# Patient Record
Sex: FEMALE | Race: WHITE | Employment: OTHER | URBAN - METROPOLITAN AREA
[De-identification: names, ages, dates, MRNs, and addresses within clinical notes are randomized per-mention and may not be internally consistent; named-entity substitution may affect disease eponyms.]

---

## 2024-02-05 ENCOUNTER — HOSPITAL ENCOUNTER (EMERGENCY)
Age: 34
Discharge: HOME | End: 2024-02-05
Attending: EMERGENCY MEDICINE
Payer: MEDICAID

## 2024-02-05 VITALS
SYSTOLIC BLOOD PRESSURE: 125 MMHG | OXYGEN SATURATION: 100 % | DIASTOLIC BLOOD PRESSURE: 90 MMHG | HEART RATE: 82 BPM | HEIGHT: 67 IN | WEIGHT: 196 LBS | TEMPERATURE: 98.2 F | RESPIRATION RATE: 17 BRPM | BODY MASS INDEX: 30.76 KG/M2

## 2024-02-05 DIAGNOSIS — S39.012A BACK STRAIN, INITIAL ENCOUNTER: Primary | ICD-10-CM

## 2024-02-05 PROCEDURE — 99283 EMERGENCY DEPT VISIT LOW MDM: CPT

## 2024-02-05 RX ORDER — IBUPROFEN 800 MG/1
800 TABLET ORAL EVERY 8 HOURS PRN
Qty: 21 TABLET | Refills: 0 | Status: SHIPPED | OUTPATIENT
Start: 2024-02-05 | End: 2024-02-05

## 2024-02-05 RX ORDER — METAXALONE 800 MG/1
400-800 TABLET ORAL 3 TIMES DAILY
Qty: 20 TABLET | Refills: 0 | Status: SHIPPED | OUTPATIENT
Start: 2024-02-05

## 2024-02-05 RX ORDER — IBUPROFEN 800 MG/1
800 TABLET ORAL EVERY 8 HOURS PRN
Qty: 21 TABLET | Refills: 0 | Status: SHIPPED | OUTPATIENT
Start: 2024-02-05

## 2024-02-05 RX ORDER — METAXALONE 800 MG/1
400-800 TABLET ORAL 3 TIMES DAILY
Qty: 20 TABLET | Refills: 0 | Status: SHIPPED | OUTPATIENT
Start: 2024-02-05 | End: 2024-02-05

## 2024-02-05 RX ORDER — TRAMADOL HYDROCHLORIDE 50 MG/1
50-100 TABLET ORAL EVERY 8 HOURS PRN
Qty: 12 TABLET | Refills: 0 | Status: SHIPPED | OUTPATIENT
Start: 2024-02-05 | End: 2024-02-05

## 2024-02-05 RX ORDER — TRAMADOL HYDROCHLORIDE 50 MG/1
50-100 TABLET ORAL EVERY 8 HOURS PRN
Qty: 12 TABLET | Refills: 0 | Status: SHIPPED | OUTPATIENT
Start: 2024-02-05 | End: 2024-02-08

## 2024-02-05 ASSESSMENT — PAIN - FUNCTIONAL ASSESSMENT: PAIN_FUNCTIONAL_ASSESSMENT: 0-10

## 2024-02-05 ASSESSMENT — PAIN DESCRIPTION - LOCATION: LOCATION: BACK;NECK

## 2024-02-05 ASSESSMENT — PAIN SCALES - GENERAL: PAINLEVEL_OUTOF10: 6

## 2024-02-05 NOTE — ED NOTES
I have reviewed discharge instructions with the patient.  The patient verbalized understanding.    Patient left ED via Discharge Method: ambulatory to Home with self    Opportunity for questions and clarification provided.       Patient given 3 scripts.         To continue your aftercare when you leave the hospital, you may receive an automated call from our care team to check in on how you are doing.  This is a free service and part of our promise to provide the best care and service to meet your aftercare needs.” If you have questions, or wish to unsubscribe from this service please call [phone].  Thank you for Choosing our [facility] Emergency Department.

## 2024-02-05 NOTE — DISCHARGE INSTRUCTIONS
Use cold packs 5 to 10 minutes, every hour to every-other-hour, for first 48 hours,  Then switch to a heating pad, 5 to 10 minutes, every hour to every-other-hour, for a few days,  Do not go to heat, right away

## 2024-02-05 NOTE — ED TRIAGE NOTES
Pt ambulatory to triage. 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 Pt denies taking anything for pain today, pt states she has been using lidocaine patches.

## 2024-02-14 NOTE — ED PROVIDER NOTES
Emergency Department Provider Note       PCP: No primary care provider on file.   Age: 33 y.o.   Sex: female     DISPOSITION Decision To Discharge 02/05/2024 03:53:05 PM       ICD-10-CM    1. Back strain, initial encounter  S39.012A traMADol (ULTRAM) 50 MG tablet     DISCONTINUED: traMADol (ULTRAM) 50 MG tablet          Medical Decision Making     Complexity of Problems Addressed:  1 minor injury    Data Reviewed and Analyzed:    X-rays not indicated    Discussion of management or test interpretation.  Atraumatic neck pain without suggestion of cauda equina syndrome.  Will prescribe NSAIDs, muscle relaxers, mild analgesics.  Recommend ice before heat.    Risk of Complications and/or Morbidity of Patient Management:  Prescription drug management performed.  Patient was discharged risks and benefits of hospitalization were considered.  Shared medical decision making was utilized in creating the patients health plan today.    History       33-year-old female presents to the ER with 1 week history of back pain.  Predominantly left-sided.  This started in the thoracic to upper lumbar region and as it has progressed , it is now going up to the neck and down the leg.  No history of back pain or sciatica.  Pain worsens with movement, she notices some spasms in her back.  Patient denies chest pain, dyspnea, abdominal pain.  There are no UTI symptoms.  Little relief with lidocaine patches         Review of Systems   Constitutional:  Negative for chills and fever.   Respiratory:  Negative for shortness of breath.    Cardiovascular:  Negative for chest pain.   Gastrointestinal: Negative.    Genitourinary: Negative.    Musculoskeletal:  Positive for back pain.   All other systems reviewed and are negative.      Physical Exam     Vitals signs and nursing note reviewed:  Vitals:    02/05/24 1442 02/05/24 1626   BP: (!) 152/84 (!) 125/90   Pulse: 81 82   Resp: 15 17   Temp: 98.2 °F (36.8 °C)    TempSrc: Oral    SpO2: 98% 100%

## 2024-03-13 ENCOUNTER — APPOINTMENT (OUTPATIENT)
Dept: ULTRASOUND IMAGING | Age: 34
End: 2024-03-13
Payer: MEDICAID

## 2024-03-13 ENCOUNTER — HOSPITAL ENCOUNTER (EMERGENCY)
Age: 34
Discharge: HOME | End: 2024-03-13
Payer: MEDICAID

## 2024-03-13 VITALS
OXYGEN SATURATION: 99 % | HEIGHT: 66 IN | SYSTOLIC BLOOD PRESSURE: 110 MMHG | HEART RATE: 70 BPM | TEMPERATURE: 98.9 F | DIASTOLIC BLOOD PRESSURE: 60 MMHG | RESPIRATION RATE: 20 BRPM | BODY MASS INDEX: 32.14 KG/M2 | WEIGHT: 200 LBS

## 2024-03-13 DIAGNOSIS — Z3A.01 LESS THAN 8 WEEKS GESTATION OF PREGNANCY: Primary | ICD-10-CM

## 2024-03-13 LAB
ABO + RH BLD: NORMAL
APPEARANCE UR: ABNORMAL
BACTERIA URNS QL MICRO: NORMAL /HPF
BILIRUB UR QL: NEGATIVE
CASTS URNS QL MICRO: 0 /LPF
COLOR UR: YELLOW
CRYSTALS URNS QL MICRO: 0 /LPF
EPI CELLS #/AREA URNS HPF: NORMAL /HPF
GLUCOSE UR STRIP.AUTO-MCNC: NEGATIVE MG/DL
HCG SERPL-ACNC: 5687 MIU/ML (ref 0–6)
HGB UR QL STRIP: ABNORMAL
KETONES UR QL STRIP.AUTO: NEGATIVE MG/DL
LEUKOCYTE ESTERASE UR QL STRIP.AUTO: NEGATIVE
MUCOUS THREADS URNS QL MICRO: 0 /LPF
NITRITE UR QL STRIP.AUTO: NEGATIVE
OTHER OBSERVATIONS: NORMAL
PH UR STRIP: 6.5 (ref 5–9)
PROT UR STRIP-MCNC: NEGATIVE MG/DL
RBC #/AREA URNS HPF: NORMAL /HPF
SP GR UR REFRACTOMETRY: >=1.03 (ref 1–1.02)
UROBILINOGEN UR QL STRIP.AUTO: 0.2 EU/DL (ref 0.2–1)
WBC URNS QL MICRO: NORMAL /HPF

## 2024-03-13 PROCEDURE — 76801 OB US < 14 WKS SINGLE FETUS: CPT

## 2024-03-13 PROCEDURE — 86900 BLOOD TYPING SEROLOGIC ABO: CPT

## 2024-03-13 PROCEDURE — 86901 BLOOD TYPING SEROLOGIC RH(D): CPT

## 2024-03-13 PROCEDURE — 99284 EMERGENCY DEPT VISIT MOD MDM: CPT

## 2024-03-13 PROCEDURE — 84702 CHORIONIC GONADOTROPIN TEST: CPT

## 2024-03-13 PROCEDURE — 81001 URINALYSIS AUTO W/SCOPE: CPT

## 2024-03-13 ASSESSMENT — PAIN - FUNCTIONAL ASSESSMENT: PAIN_FUNCTIONAL_ASSESSMENT: NONE - DENIES PAIN

## 2024-03-13 NOTE — ED TRIAGE NOTES
Per patient 5 positive pregnancy test at home. Denies any s/sx. States last menstrual cycle was the beginning of February.

## 2024-03-13 NOTE — ED PROVIDER NOTES
and  ultrasound.    A 1.8 x 1.5 x 1.9 cm corpus luteum cyst in the right ovary.    No free pelvic fluid or adnexal mass.    No sonographic evidence of ovarian torsion.   Urinalysis w rflx microscopic   Result Value Ref Range    Color, UA YELLOW      Appearance SLIGHTLY CLOUDY      Specific Gravity, UA >=1.030 1.001 - 1.023    pH, Urine 6.5 5.0 - 9.0      Protein, UA Negative NEG mg/dL    Glucose, UA Negative mg/dL    Ketones, Urine Negative NEG mg/dL    Bilirubin Urine Negative NEG      Blood, Urine Trace Intact (A) NEG      Urobilinogen, Urine 0.2 0.2 - 1.0 EU/dL    Nitrite, Urine Negative NEG      Leukocyte Esterase, Urine Negative NEG     HCG, Quantitative, Pregnancy   Result Value Ref Range    hCG Quant 5,687 (H) 0.0 - 6.0 MIU/ML   Urinalysis, Micro   Result Value Ref Range    WBC, UA 0-3 0 /hpf    RBC, UA 0-3 0 /hpf    Epithelial Cells UA 5-10 0 /hpf    BACTERIA, URINE TRACE 0 /hpf    Casts 0 0 /lpf    Crystals 0 0 /LPF    Mucus, UA 0 0 /lpf    Other observations RESULTS VERIFIED MANUALLY           US TRANSABDOMINAL TRANSVAGINAL LESS THAN 14 WEEKS   Final Result   Intrauterine pregnancy with a gestational sac and yolk sac in the endometrial   cavity. The mean sac diameter is 7.8 mm. Recommend follow-up with beta-hCGs and   ultrasound.      A 1.8 x 1.5 x 1.9 cm corpus luteum cyst in the right ovary.      No free pelvic fluid or adnexal mass.      No sonographic evidence of ovarian torsion.                   No results for input(s): \"COVID19\" in the last 72 hours.    Voice dictation software was used during the making of this note.  This software is not perfect and grammatical and other typographical errors may be present.  This note has not been completely proofread for errors.     Annamaria Mendez PA-C  03/13/24 [id number]

## 2024-03-13 NOTE — DISCHARGE INSTRUCTIONS
Your pregnancy test was positive.  Ultrasound did reveal an intrauterine pregnancy.  Please follow-up with OB, their numbers listed above.     Please return to the ER if you get experiencing cramping, pain, bleeding, excetra.

## 2024-03-27 ENCOUNTER — TELEPHONE (OUTPATIENT)
Dept: OBGYN CLINIC | Age: 34
End: 2024-03-27

## 2024-03-27 NOTE — TELEPHONE ENCOUNTER
Called patient to set up appointment.  Patient states she will need to call back after she gets her schedule form work.

## 2024-09-13 NOTE — TELEPHONE ENCOUNTER
----- Message from JULIO C Lombardo CNP sent at 3/27/2024  3:29 PM EDT -----  Regarding: RE: Referral Appointment  Preg confirmation US and talk - IUP seen on US  ----- Message -----  From: Janet Clarke  Sent: 3/27/2024  11:34 AM EDT  To: JULIO C Lombardo CNP  Subject: FW: Referral Appointment                         Please review ED records and provide f/u recommendations.  ----- Message -----  From: Armida Fournier  Sent: 3/27/2024  10:43 AM EDT  To: Janet Clarke  Subject: Referral Appointment                             Pt referral from ER less than 8 weeks pregnant         (1) Female

## 2024-12-02 ENCOUNTER — TELEPHONE (OUTPATIENT)
Dept: ORTHOPEDIC SURGERY | Age: 34
End: 2024-12-02

## 2025-01-23 ENCOUNTER — TELEPHONE (OUTPATIENT)
Dept: ORTHOPEDIC SURGERY | Age: 35
End: 2025-01-23

## 2025-01-23 ENCOUNTER — OFFICE VISIT (OUTPATIENT)
Age: 35
End: 2025-01-23

## 2025-01-23 VITALS — WEIGHT: 201.8 LBS | HEIGHT: 67 IN | BODY MASS INDEX: 31.67 KG/M2

## 2025-01-23 DIAGNOSIS — M54.50 LOW BACK PAIN, UNSPECIFIED BACK PAIN LATERALITY, UNSPECIFIED CHRONICITY, UNSPECIFIED WHETHER SCIATICA PRESENT: Primary | ICD-10-CM

## 2025-01-23 DIAGNOSIS — M47.816 LUMBAR FACET ARTHROPATHY: ICD-10-CM

## 2025-01-23 DIAGNOSIS — M51.360 DEGENERATION OF INTERVERTEBRAL DISC OF LUMBAR REGION WITH DISCOGENIC BACK PAIN: ICD-10-CM

## 2025-01-23 DIAGNOSIS — M54.16 LUMBAR RADICULOPATHY: ICD-10-CM

## 2025-01-30 NOTE — PROGRESS NOTES
Name: Gisela Stewart  YOB: 1990  Gender: female  MRN: [id number]    CC: Acute low back and right leg pain    HPI: This is a 34 y.o. year old female who presents after an injury at work in December 2024.  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  Pain is across the lower back into the right buttock down the posterior leg to her mid thigh.  It is constant discomfort but worse with position change in activity.  She was given ibuprofen and Skelaxin by the Worker's Comp. provider.  She was also placed on restrictions of 10 pounds limit of lifting no pushing or pulling or bending.  She has had no physical therapy.  She has had an MRI that revealed a disc bulge with small central protrusion and mild facet arthropathy at L5-S1.      The patient denies any change in bowel or bladder function since the onset of the symptoms. she  has not had lumbar surgery in the past.      Thus far, the patient has tried NSAIDS and muscle relaxers    Current pain level: 7-8/10  Activities limited by pain: Constant however increased activity or position change makes her pain worse           1/23/2025     2:26 PM   AMB PAIN ASSESSMENT   Location of Pain Back   Location Modifiers Right;Left   Severity of Pain 7   Frequency of Pain Constant   Limiting Behavior Yes   Result of Injury No   Work-Related Injury No   Are there other pain locations you wish to document? No            ROS/Meds/PSH/PMH/FH/SH: I personally reviewed the patient's collected intake data.  Below are the pertinents:    No Known Allergies      Current Outpatient Medications:     ibuprofen (IBU) 800 MG tablet, Take 1 tablet by mouth every 8 hours as needed for Pain (Patient not taking: Reported on 1/23/2025), Disp: 21 tablet, Rfl: 0    metaxalone (SKELAXIN) 800 MG tablet, Take 0.5-1 tablets by mouth 3 times daily (Patient not taking: Reported on 1/23/2025), Disp: 20 tablet, Rfl: 0    No past surgical history on file.    There is no problem list on file for

## 2025-02-04 ENCOUNTER — OFFICE VISIT (OUTPATIENT)
Dept: ORTHOPEDIC SURGERY | Age: 35
End: 2025-02-04
Payer: COMMERCIAL

## 2025-02-04 DIAGNOSIS — M54.6 MIDLINE THORACIC BACK PAIN, UNSPECIFIED CHRONICITY: ICD-10-CM

## 2025-02-04 DIAGNOSIS — M47.816 SPONDYLOSIS OF LUMBAR REGION WITHOUT MYELOPATHY OR RADICULOPATHY: Primary | ICD-10-CM

## 2025-02-04 DIAGNOSIS — M54.50 LUMBAR BACK PAIN: ICD-10-CM

## 2025-02-04 DIAGNOSIS — M79.10 MYALGIA, UNSPECIFIED SITE: ICD-10-CM

## 2025-02-04 PROCEDURE — 99203 OFFICE O/P NEW LOW 30 MIN: CPT | Performed by: PHYSICAL MEDICINE & REHABILITATION

## 2025-02-04 NOTE — PROGRESS NOTES
Date:  02/04/25     HPI:  I am seeing Gisela Stewart in evalution/folowup.  She has had problems since December.  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  Since that time she has had pain in the lower lumbar paraspinal areas, dull and sharp.  Worse with sitting, walking, bending, twisting.  No consistent relieving factors.  The pain may gravitate down the posterior and lateral aspect of the right leg to around the knee.  No groin pain no clear weakness, numbness, tingling.  Pain scale is 8+/10.  This causes trouble walking, standing, work related duties.  She just darted physical therapy yesterday.  She has tried ibuprofen and then lidocaine patches.  No muscle relaxers, neuromodulators, oral steroids, stronger analgesics, spine injections.  She denies a history of prior spine surgical intervention.  Overall she is probably 20% better from onset.    She also has some intrascapular pain is more of a dull aching issue.  This comes and goes.    She was evaluated by BRIAN Rincon and MR imaging revealed bulging disc with small protrusion and facet arthropathy at the L5-S1 level.  Is currently felt there is not a spine surgical issue and she is referred for nonsurgical discussions.    Current work status reveals no lifting greater than 10 pounds.  No bending, twisting, jumping, etc.  She is currently on light duty.    PMH: As per outside records.    PSH: Pertinent PSH reveals no history of spine surgical intervention.    FH: Noncontributory to this visit.    Social History     Socioeconomic History    Marital status: Single     Spouse name: Not on file    Number of children: Not on file    Years of education: Not on file    Highest education level: Not on file   Occupational History    Not on file   Tobacco Use    Smoking status: Never    Smokeless tobacco: Never   Substance and Sexual Activity    Alcohol use: Yes    Drug use: Never    Sexual

## 2025-03-27 ENCOUNTER — OFFICE VISIT (OUTPATIENT)
Dept: ORTHOPEDIC SURGERY | Age: 35
End: 2025-03-27
Payer: MEDICAID

## 2025-03-27 DIAGNOSIS — M54.50 LUMBAR BACK PAIN: ICD-10-CM

## 2025-03-27 DIAGNOSIS — M79.10 MYALGIA, UNSPECIFIED SITE: ICD-10-CM

## 2025-03-27 DIAGNOSIS — M47.816 SPONDYLOSIS OF LUMBAR REGION WITHOUT MYELOPATHY OR RADICULOPATHY: Primary | ICD-10-CM

## 2025-03-27 DIAGNOSIS — M54.6 MIDLINE THORACIC BACK PAIN, UNSPECIFIED CHRONICITY: ICD-10-CM

## 2025-03-27 PROCEDURE — 99214 OFFICE O/P EST MOD 30 MIN: CPT | Performed by: PHYSICAL MEDICINE & REHABILITATION

## 2025-03-27 NOTE — PROGRESS NOTES
Date:  03/27/25     HPI:  I am seeing Gisela Stewart in evalution/folowup.  I saw her initially just under 2 months ago.  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  She has had pain in the lower lumbar paraspinal areas, dull and sharp.  Worse with activities.  Sometimes may gravitate posteriorly and laterally down the right leg to around the knee.  Currently undergoing physical therapy.  Was evaluated by BRIAN Rincon and was not felt to have spine surgical issue.  MR imaging revealed only disc bulging, nonsurgical.    Current work status is no lifting greater than 10 pounds without significant bending twisting jumping etc.  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  Apparently all parties are comfortable with the current situation.    She is doing persistent issues, physical therapy might be having a little benefit and is felt they might want to continue it for little bit longer.  Her pain gravitating down the right leg is really more in the buttock and lateral hip area, not so much to the knee so that gets that is an improvement.    She does have issues with thoracic paraspinal pain, prior round level of T5 but that is not under Workmen's Compensation, at least at this time.    ROS: No new problems    PE: Alert cooperative.  Good strength lower extremities.  No pathologic heightened reflexes.  Good range of motion of the hips.  She has tenderness in the lower lumbar paraspinal areas but not really the buttocks.  She also has tenderness in the upper thoracic paraspinal areas bilaterally, this around level of T5.    Assessment/Plan:       PREDOMINANTLY MUSCULOSKELETAL LUMBOSACRAL  SPINE PAIN.  Mostly facet joint mediated pain, does have some pseudo radicular symptoms which the latter appear to be somewhat better.  Intermittent pain but still significant.  She would like to continue

## 2025-06-19 ENCOUNTER — OFFICE VISIT (OUTPATIENT)
Dept: ORTHOPEDIC SURGERY | Age: 35
End: 2025-06-19
Payer: MEDICAID

## 2025-06-19 DIAGNOSIS — M79.10 MYALGIA, UNSPECIFIED SITE: ICD-10-CM

## 2025-06-19 DIAGNOSIS — M54.50 LUMBAR BACK PAIN: ICD-10-CM

## 2025-06-19 DIAGNOSIS — M47.816 SPONDYLOSIS OF LUMBAR REGION WITHOUT MYELOPATHY OR RADICULOPATHY: Primary | ICD-10-CM

## 2025-06-19 DIAGNOSIS — M54.6 MIDLINE THORACIC BACK PAIN, UNSPECIFIED CHRONICITY: ICD-10-CM

## 2025-06-19 PROCEDURE — 99214 OFFICE O/P EST MOD 30 MIN: CPT | Performed by: PHYSICAL MEDICINE & REHABILITATION

## 2025-06-19 NOTE — PROGRESS NOTES
Date:  06/19/25     HPI:  I am seeing Gisela Stewart in evalution/folowup.  Extended visit, Workmen's Compensation case.  This involves full discussion with patient, appropriate paperwork, full discussion with patient's .    I saw her most recently about 3 months ago.  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  Lower lumbar paraspinal pain.  Been through full workup, currently not felt to have a spine surgical issue.  MR imaging revealed disc bulging.  She has had physical therapy with some benefit but not much more than that.  She has aching pain lower lumbar areas not every day but is worse with activity such as standing or walking.  It can become pretty uncomfortable.  Regardless she is doing well with her current work situation.  She does not feel things to the point she wants to proceed with further intervention.  Namely that would be fluoroscopically guided spine injections.    She does have some issues with thoracic paraspinal pain.  Appears to be manageable but is not under Workmen's Compensation.  That was not further discussed today.    ROS: As per previous note    PE: Alert and cooperative.  Has good strength in lower extremities.  No pathologic heightened reflexes.  Good range of motion in the hips.  Some tenderness lower lumbar paraspinal areas, accentuated by extension.  No buttock tenderness.  I did not examine her thoracic spine today.    Assessment/Plan:       PREDOMINANTLY MUSCULOSKELETAL LUMBOSACRAL  SPINE PAIN.  Facet joint arthropathy.  At maximum medical improvement that she wants to proceed with spine injections.  She is comfortable sitting tight.  After full discussion with the patient and  we can get her back to full duty and see how she does with that.  If she has problems that we can reevaluate.  If she has further problems in the future she may come in and we can reevaluate for fluoroscopically guided facet joint injections.  These